# Patient Record
Sex: FEMALE | Race: WHITE | Employment: FULL TIME | URBAN - METROPOLITAN AREA
[De-identification: names, ages, dates, MRNs, and addresses within clinical notes are randomized per-mention and may not be internally consistent; named-entity substitution may affect disease eponyms.]

---

## 2024-03-01 ENCOUNTER — APPOINTMENT (OUTPATIENT)
Dept: GENERAL RADIOLOGY | Age: 24
End: 2024-03-01
Payer: COMMERCIAL

## 2024-03-01 ENCOUNTER — HOSPITAL ENCOUNTER (EMERGENCY)
Age: 24
Discharge: HOME | End: 2024-03-01
Attending: EMERGENCY MEDICINE
Payer: COMMERCIAL

## 2024-03-01 VITALS
RESPIRATION RATE: 16 BRPM | TEMPERATURE: 98.2 F | DIASTOLIC BLOOD PRESSURE: 82 MMHG | HEART RATE: 75 BPM | OXYGEN SATURATION: 100 % | SYSTOLIC BLOOD PRESSURE: 127 MMHG | HEIGHT: 66 IN | WEIGHT: 200 LBS | BODY MASS INDEX: 32.14 KG/M2

## 2024-03-01 DIAGNOSIS — M25.532 LEFT WRIST PAIN: Primary | ICD-10-CM

## 2024-03-01 PROCEDURE — 99283 EMERGENCY DEPT VISIT LOW MDM: CPT

## 2024-03-01 PROCEDURE — 73110 X-RAY EXAM OF WRIST: CPT

## 2024-03-01 ASSESSMENT — PAIN - FUNCTIONAL ASSESSMENT: PAIN_FUNCTIONAL_ASSESSMENT: NONE - DENIES PAIN

## 2024-03-01 NOTE — ED PROVIDER NOTES
immediate call or follow up with their primary physician or return to the emergency department.    I have reviewed the disposition diagnosis with the patient and or their family/guardian. I have answered their questions and given discharge instructions. They voiced understanding of these instructions and did not have any further questions or complaints.       CONSULTS:  None    PROCEDURES:  None    FINAL IMPRESSION      1. Left wrist pain          DISPOSITION / PLAN     CONDITION ON DISPOSITION:   Good / Stable for discharge. home    PATIENT REFERRED TO:  No follow-up provider specified.    DISCHARGE MEDICATIONS:  New Prescriptions    No medications on file       CHRIS Renee CNP   Emergency Medicine Physician Assistant    (Please note that portions of this note were completed with a voice recognition program.  Efforts were made to edit the dictations but occasionally words aremis-transcribed.)       Lazara Dominguez APRN - CNP  03/01/24 1932

## 2024-03-01 NOTE — ED PROVIDER NOTES
[facility] Emergency Department    [address]  [facility] [address]  Phone: [phone]  Fax: [phone]  Emergency Department  Faculty Attestation    I performed a history and physical examination of the patient and discussed management with the mid level provider. I reviewed the mid level provider's note and agree with the documented findings and plan of care. Any areas of disagreement are noted on the chart. I was personally present for the key portions of any procedures. I have documented in the chart those procedures where I was not present during the key portions. I have reviewed the emergency nurses triage note. I agree with the chief complaint, past medical history, past surgical history, allergies, medications, social and family history as documented unless otherwise noted below. Documentation of the HPI, Physical Exam and Medical Decision Making performed by medical students or scribes is based on my personal performance of the HPI, PE and MDM. For Physician Assistant/ Nurse Practitioner cases/documentation I have personally evaluated this patient and have completed at least one if not all key elements of the E/M (history, physical exam, and MDM). Additional findings are as noted.      Primary Care Physician:  No, Pcp    CHIEF COMPLAINT       Chief Complaint   Patient presents with    Arm Injury     Seen Wednesday for arm injury, employer wants xray of arm       RECENT VITALS:   Temp: 98.2 °F (36.8 °C),  Pulse: 75, Respirations: 16, BP: 127/82    LABS:  Labs Reviewed - No data to display        XR WRIST LEFT (MIN 3 VIEWS) (Final result)  Result time 03/01/24 19:19:25  Final result by Adarsh Stroud MD (03/01/24 19:19:25)                Impression:    Normal wrist radiographs            Narrative:    EXAMINATION:  3 XRAY VIEWS OF THE LEFT WRIST    3/1/2024 6:37 pm    COMPARISON:  None.    HISTORY:  ORDERING SYSTEM PROVIDED HISTORY: injury tuesday night  TECHNOLOGIST  PROVIDED HISTORY:  injury tuesday night  Reason for Exam: Patient states that a patient of theirs had twisted their  arm and caused injury on Tuesday night. Left wrist pain since. Patient also  states that they may have fractured that wrist when they were younger, but is  not entirely sure.    FINDINGS:  Distal radius and ulna are intact.  Carpal bones and alignment are  maintained.  No acute fracture or dislocation.                  PERTINENT ATTENDING PHYSICIAN COMMENTS:    The patient presents with arm pain.  She was injured on Tuesday. Today is Friday.  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  She was seen at an urgent care, but no X-rays were obtained.  She is concerned for the increased bruising.  She denies numbness, tingling, or loss of function.    On exam, the patient has superficial bruising over the distal radius on the forearm.  She has a normal radial pulse and normal  strength in the fingers.  X-ray is unremarkable. The patient is discharged in good condition.     Evonne Ma MD  03/02/24 [id number]       Evonne Ma MD  03/02/24 1932

## 2024-03-02 NOTE — DISCHARGE INSTRUCTIONS
There is no fractures on your x-ray, continue ice therapy and over-the-counter Tylenol Motrin and follow-up with your Worker's Comp. HR department